# Patient Record
Sex: FEMALE | Race: BLACK OR AFRICAN AMERICAN | NOT HISPANIC OR LATINO | Employment: OTHER | URBAN - METROPOLITAN AREA
[De-identification: names, ages, dates, MRNs, and addresses within clinical notes are randomized per-mention and may not be internally consistent; named-entity substitution may affect disease eponyms.]

---

## 2022-04-16 ENCOUNTER — HISTORICAL (OUTPATIENT)
Dept: ADMINISTRATIVE | Facility: HOSPITAL | Age: 52
End: 2022-04-16

## 2023-01-14 ENCOUNTER — HOSPITAL ENCOUNTER (EMERGENCY)
Facility: HOSPITAL | Age: 53
Discharge: HOME | End: 2023-01-14
Attending: STUDENT IN AN ORGANIZED HEALTH CARE EDUCATION/TRAINING PROGRAM
Payer: OTHER GOVERNMENT

## 2023-01-14 VITALS
HEART RATE: 98 BPM | HEIGHT: 62 IN | SYSTOLIC BLOOD PRESSURE: 156 MMHG | BODY MASS INDEX: 48.76 KG/M2 | OXYGEN SATURATION: 98 % | TEMPERATURE: 98 F | DIASTOLIC BLOOD PRESSURE: 88 MMHG | WEIGHT: 265 LBS | RESPIRATION RATE: 16 BRPM

## 2023-01-14 DIAGNOSIS — R21 RASH: Primary | ICD-10-CM

## 2023-01-14 PROCEDURE — 96372 THER/PROPH/DIAG INJ SC/IM: CPT | Performed by: PHYSICIAN ASSISTANT

## 2023-01-14 PROCEDURE — 82962 GLUCOSE BLOOD TEST: CPT

## 2023-01-14 PROCEDURE — 63600175 PHARM REV CODE 636 W HCPCS: Performed by: PHYSICIAN ASSISTANT

## 2023-01-14 PROCEDURE — 99284 EMERGENCY DEPT VISIT MOD MDM: CPT

## 2023-01-14 PROCEDURE — 25000003 PHARM REV CODE 250: Performed by: PHYSICIAN ASSISTANT

## 2023-01-14 RX ORDER — FAMOTIDINE 20 MG/1
40 TABLET, FILM COATED ORAL 2 TIMES DAILY
Qty: 56 TABLET | Refills: 0 | Status: SHIPPED | OUTPATIENT
Start: 2023-01-14 | End: 2023-04-22

## 2023-01-14 RX ORDER — FAMOTIDINE 20 MG/1
40 TABLET, FILM COATED ORAL 2 TIMES DAILY
Status: DISCONTINUED | OUTPATIENT
Start: 2023-01-14 | End: 2023-01-14

## 2023-01-14 RX ORDER — METHYLPREDNISOLONE SOD SUCC 125 MG
125 VIAL (EA) INJECTION
Status: COMPLETED | OUTPATIENT
Start: 2023-01-14 | End: 2023-01-14

## 2023-01-14 RX ORDER — PREDNISONE 20 MG/1
20 TABLET ORAL DAILY
Qty: 5 TABLET | Refills: 0 | Status: SHIPPED | OUTPATIENT
Start: 2023-01-14 | End: 2023-01-19

## 2023-01-14 RX ORDER — FAMOTIDINE 20 MG/1
40 TABLET, FILM COATED ORAL
Status: COMPLETED | OUTPATIENT
Start: 2023-01-14 | End: 2023-01-14

## 2023-01-14 RX ADMIN — FAMOTIDINE 40 MG: 20 TABLET, FILM COATED ORAL at 09:01

## 2023-01-14 RX ADMIN — METHYLPREDNISOLONE SODIUM SUCCINATE 125 MG: 125 INJECTION, POWDER, FOR SOLUTION INTRAMUSCULAR; INTRAVENOUS at 09:01

## 2023-01-15 LAB — POCT GLUCOSE: 139 MG/DL (ref 70–110)

## 2023-01-15 NOTE — ED PROVIDER NOTES
Encounter Date: 1/14/2023       History     Chief Complaint   Patient presents with    Rash     Complaint of rash to body, arms, legs.  Did change wash detergent recently.       See MDM    The history is provided by the patient. No  was used.   Review of patient's allergies indicates:  No Known Allergies  No past medical history on file.  No past surgical history on file.  No family history on file.     Review of Systems   Constitutional:  Negative for fever.   HENT:  Negative for sore throat.    Respiratory:  Negative for shortness of breath.    Cardiovascular:  Negative for chest pain.   Gastrointestinal:  Negative for nausea.   Genitourinary:  Negative for dysuria.   Musculoskeletal:  Negative for back pain.   Skin:  Positive for rash.   Neurological:  Negative for weakness.   Hematological:  Does not bruise/bleed easily.   All other systems reviewed and are negative.    Physical Exam     Initial Vitals [01/14/23 2021]   BP Pulse Resp Temp SpO2   (!) 155/94 (!) 113 20 98.3 °F (36.8 °C) 97 %      MAP       --         Physical Exam    Nursing note and vitals reviewed.  Constitutional: She appears well-developed and well-nourished.   HENT:   Head: Normocephalic and atraumatic.   Mouth/Throat: Uvula is midline, oropharynx is clear and moist and mucous membranes are normal.   Eyes: EOM are normal. Pupils are equal, round, and reactive to light.   Neck: Neck supple.   Cardiovascular:  Normal rate and regular rhythm.           Pulmonary/Chest: Breath sounds normal. She has no decreased breath sounds. She has no wheezes. She has no rhonchi.   Musculoskeletal:         General: Normal range of motion.      Cervical back: Neck supple.     Neurological: She is alert and oriented to person, place, and time. She has normal strength. GCS score is 15. GCS eye subscore is 4. GCS verbal subscore is 5. GCS motor subscore is 6.   Skin: Skin is warm and dry. Rash noted. Rash is maculopapular (to diffuse chest,  "BUE, bilateral thighs, abdominal wall). Rash is not macular and not papular.   Psychiatric: She has a normal mood and affect.       ED Course   Procedures  Labs Reviewed - No data to display       Imaging Results    None          Medications   methylPREDNISolone sodium succinate injection 125 mg (has no administration in time range)   famotidine tablet 40 mg (has no administration in time range)     Medical Decision Making:   Initial Assessment:   52 y.o. female presents to the ED with diffuse rash to chest wall, bilateral upper extremities, bilateral thighs, abdominal wall for the last few weeks.  States it is progressing and not getting any better.  Patient states she went to urgent care on January 4th, where she got a steroid shot and medication which he has been taking with very minimal relief.  States something similar to this happened in December after getting the shingles vaccine.  States she received a steroid shot and notes that her symptoms were gone in 3 days.  Denies throat pain or swelling, chest pain, cough, wheezing, shortness of breath  Differential Diagnosis:   Rash, contact dermatitis, allergic reaction, viral exanthem  ED Management:  Patient was given Pepcid in the ED as well as Solu-Medrol.  Will discharge patient home with oral steroids as well as Pepcid to take over the next few days.  Long discussion with patient regarding immunology is a next step.  Patient is a  and has [facility].  I will give her multiple names with immunologist in [facility] for her to get set up with an outpatient basis in order for her to have coverage.  Patient's vital signs stable.  Discussed strict return precautions for any worsening symptoms..                 BP (!) 156/88   Pulse 98   Temp 98.3 °F (36.8 °C) (Oral)   Resp 16   Ht 5' 2.21" (1.58 m)   Wt 120.2 kg (265 lb)   SpO2 98%   BMI 48.15 kg/m²          Clinical Impression:   Final diagnoses:  [R21] Rash (Primary)        ED " Disposition Condition    Discharge Stable          ED Prescriptions       Medication Sig Dispense Start Date End Date Auth. Provider    famotidine (PEPCID) 20 MG tablet Take 2 tablets (40 mg total) by mouth 2 (two) times daily. for 14 days 56 tablet 1/14/2023 1/28/2023 Jace Montilla PA-C    predniSONE (DELTASONE) 20 MG tablet Take 1 tablet (20 mg total) by mouth once daily. for 5 days 5 tablet 1/14/2023 1/19/2023 Jace Montilla PA-C          Follow-up Information       Follow up With Specialties Details Why Contact Info    Primary care provider  In 2 days As needed     [facility] - Emergency Dept Emergency Medicine In 1 week If symptoms worsen [address] [id number] [phone]    Eveline Conklin MD Allergy and Immunology  Immunology [address] [place]  [address]  [facility]  [facility] [address]  [phone]      Rafiq Garcia MD Allergy and Immunology  Immunology [address] [facility] [address]  [phone]      Luke Whaley MD Allergy   [address]  [facility] [address]  [phone]               Jace Montilla PA-C  01/14/23 2125

## 2023-01-15 NOTE — DISCHARGE INSTRUCTIONS
Make sure to avoid foods that you believe could be the source of your reaction.  Do not take hot baths and avoid overheating.  Take oral steroids beginning tomorrow around lunchtime.  Also take Pepcid daily as prescribed.  Return to the ER immediately if symptoms progress such as chest pain, shortness breath, wheezing, throat swelling

## 2023-04-22 ENCOUNTER — HOSPITAL ENCOUNTER (EMERGENCY)
Facility: HOSPITAL | Age: 53
Discharge: HOME | End: 2023-04-22
Attending: EMERGENCY MEDICINE
Payer: OTHER GOVERNMENT

## 2023-04-22 VITALS
SYSTOLIC BLOOD PRESSURE: 138 MMHG | HEIGHT: 62 IN | OXYGEN SATURATION: 98 % | WEIGHT: 262 LBS | BODY MASS INDEX: 48.21 KG/M2 | HEART RATE: 114 BPM | DIASTOLIC BLOOD PRESSURE: 84 MMHG | RESPIRATION RATE: 18 BRPM

## 2023-04-22 DIAGNOSIS — K52.9 GASTROENTERITIS: ICD-10-CM

## 2023-04-22 DIAGNOSIS — R10.13 EPIGASTRIC ABDOMINAL PAIN: Primary | ICD-10-CM

## 2023-04-22 LAB
ALBUMIN SERPL-MCNC: 3.9 G/DL (ref 3.5–5)
ALBUMIN/GLOB SERPL: 1.1 RATIO (ref 1.1–2)
ALP SERPL-CCNC: 104 UNIT/L (ref 40–150)
ALT SERPL-CCNC: 51 UNIT/L (ref 0–55)
APPEARANCE UR: CLEAR
AST SERPL-CCNC: 48 UNIT/L (ref 5–34)
BACTERIA #/AREA URNS AUTO: NORMAL /HPF
BASOPHILS # BLD AUTO: 0.01 X10(3)/MCL (ref 0–0.2)
BASOPHILS NFR BLD AUTO: 0.1 %
BILIRUB UR QL STRIP.AUTO: NEGATIVE MG/DL
BILIRUBIN DIRECT+TOT PNL SERPL-MCNC: 0.4 MG/DL
BUN SERPL-MCNC: 11.5 MG/DL (ref 9.8–20.1)
CALCIUM SERPL-MCNC: 9.5 MG/DL (ref 8.4–10.2)
CHLORIDE SERPL-SCNC: 106 MMOL/L (ref 98–107)
CO2 SERPL-SCNC: 22 MMOL/L (ref 22–29)
COLOR UR AUTO: YELLOW
CREAT SERPL-MCNC: 0.77 MG/DL (ref 0.55–1.02)
EOSINOPHIL # BLD AUTO: 0 X10(3)/MCL (ref 0–0.9)
EOSINOPHIL NFR BLD AUTO: 0 %
ERYTHROCYTE [DISTWIDTH] IN BLOOD BY AUTOMATED COUNT: 13 % (ref 11.5–17)
GFR SERPLBLD CREATININE-BSD FMLA CKD-EPI: >60 MLS/MIN/1.73/M2
GLOBULIN SER-MCNC: 3.7 GM/DL (ref 2.4–3.5)
GLUCOSE SERPL-MCNC: 109 MG/DL (ref 74–100)
GLUCOSE UR QL STRIP.AUTO: NEGATIVE MG/DL
HCT VFR BLD AUTO: 42.5 % (ref 37–47)
HGB BLD-MCNC: 14.5 G/DL (ref 12–16)
IMM GRANULOCYTES # BLD AUTO: 0.02 X10(3)/MCL (ref 0–0.04)
IMM GRANULOCYTES NFR BLD AUTO: 0.2 %
KETONES UR QL STRIP.AUTO: ABNORMAL MG/DL
LEUKOCYTE ESTERASE UR QL STRIP.AUTO: ABNORMAL UNIT/L
LIPASE SERPL-CCNC: 4 U/L
LYMPHOCYTES # BLD AUTO: 0.33 X10(3)/MCL (ref 0.6–4.6)
LYMPHOCYTES NFR BLD AUTO: 4 %
MCH RBC QN AUTO: 28.5 PG (ref 27–31)
MCHC RBC AUTO-ENTMCNC: 34.1 G/DL (ref 33–36)
MCV RBC AUTO: 83.7 FL (ref 80–94)
MONOCYTES # BLD AUTO: 0.27 X10(3)/MCL (ref 0.1–1.3)
MONOCYTES NFR BLD AUTO: 3.3 %
NEUTROPHILS # BLD AUTO: 7.56 X10(3)/MCL (ref 2.1–9.2)
NEUTROPHILS NFR BLD AUTO: 92.4 %
NITRITE UR QL STRIP.AUTO: NEGATIVE
NRBC BLD AUTO-RTO: 0 %
PH UR STRIP.AUTO: 7.5 [PH]
PLATELET # BLD AUTO: 319 X10(3)/MCL (ref 130–400)
PMV BLD AUTO: 9.7 FL (ref 7.4–10.4)
POTASSIUM SERPL-SCNC: 3.2 MMOL/L (ref 3.5–5.1)
PROT SERPL-MCNC: 7.6 GM/DL (ref 6.4–8.3)
PROT UR QL STRIP.AUTO: NEGATIVE MG/DL
RBC # BLD AUTO: 5.08 X10(6)/MCL (ref 4.2–5.4)
RBC #/AREA URNS AUTO: <5 /HPF
RBC UR QL AUTO: NEGATIVE UNIT/L
SODIUM SERPL-SCNC: 139 MMOL/L (ref 136–145)
SP GR UR STRIP.AUTO: 1.02 (ref 1–1.03)
SQUAMOUS #/AREA URNS AUTO: <5 /HPF
TROPONIN I SERPL-MCNC: <0.01 NG/ML (ref 0–0.04)
UROBILINOGEN UR STRIP-ACNC: 1 MG/DL
WBC # SPEC AUTO: 8.2 X10(3)/MCL (ref 4.5–11.5)
WBC #/AREA URNS AUTO: <5 /HPF

## 2023-04-22 PROCEDURE — C9113 INJ PANTOPRAZOLE SODIUM, VIA: HCPCS | Performed by: PHYSICIAN ASSISTANT

## 2023-04-22 PROCEDURE — 63600175 PHARM REV CODE 636 W HCPCS: Performed by: PHYSICIAN ASSISTANT

## 2023-04-22 PROCEDURE — 85025 COMPLETE CBC W/AUTO DIFF WBC: CPT | Performed by: PHYSICIAN ASSISTANT

## 2023-04-22 PROCEDURE — 80053 COMPREHEN METABOLIC PANEL: CPT | Performed by: PHYSICIAN ASSISTANT

## 2023-04-22 PROCEDURE — 84484 ASSAY OF TROPONIN QUANT: CPT | Performed by: PHYSICIAN ASSISTANT

## 2023-04-22 PROCEDURE — 93005 ELECTROCARDIOGRAM TRACING: CPT

## 2023-04-22 PROCEDURE — 83690 ASSAY OF LIPASE: CPT | Performed by: PHYSICIAN ASSISTANT

## 2023-04-22 PROCEDURE — 96374 THER/PROPH/DIAG INJ IV PUSH: CPT

## 2023-04-22 PROCEDURE — 96375 TX/PRO/DX INJ NEW DRUG ADDON: CPT

## 2023-04-22 PROCEDURE — 81001 URINALYSIS AUTO W/SCOPE: CPT | Performed by: PHYSICIAN ASSISTANT

## 2023-04-22 PROCEDURE — 93010 ELECTROCARDIOGRAM REPORT: CPT | Mod: ,,, | Performed by: INTERNAL MEDICINE

## 2023-04-22 PROCEDURE — 99285 EMERGENCY DEPT VISIT HI MDM: CPT | Mod: 25

## 2023-04-22 PROCEDURE — 93010 EKG 12-LEAD: ICD-10-PCS | Mod: ,,, | Performed by: INTERNAL MEDICINE

## 2023-04-22 RX ORDER — PANTOPRAZOLE SODIUM 20 MG/1
20 TABLET, DELAYED RELEASE ORAL DAILY
Qty: 10 TABLET | Refills: 0 | Status: SHIPPED | OUTPATIENT
Start: 2023-04-22 | End: 2023-05-02

## 2023-04-22 RX ORDER — ONDANSETRON 4 MG/1
4 TABLET, FILM COATED ORAL EVERY 6 HOURS
Qty: 12 TABLET | Refills: 0 | Status: SHIPPED | OUTPATIENT
Start: 2023-04-22

## 2023-04-22 RX ORDER — PANTOPRAZOLE SODIUM 40 MG/10ML
40 INJECTION, POWDER, LYOPHILIZED, FOR SOLUTION INTRAVENOUS
Status: COMPLETED | OUTPATIENT
Start: 2023-04-22 | End: 2023-04-22

## 2023-04-22 RX ORDER — PANTOPRAZOLE SODIUM 20 MG/1
20 TABLET, DELAYED RELEASE ORAL DAILY
Qty: 10 TABLET | Refills: 0 | Status: SHIPPED | OUTPATIENT
Start: 2023-04-22 | End: 2023-04-22 | Stop reason: SDUPTHER

## 2023-04-22 RX ORDER — DICYCLOMINE HYDROCHLORIDE 20 MG/1
20 TABLET ORAL 2 TIMES DAILY
Qty: 20 TABLET | Refills: 0 | Status: SHIPPED | OUTPATIENT
Start: 2023-04-22 | End: 2023-04-22 | Stop reason: SDUPTHER

## 2023-04-22 RX ORDER — ONDANSETRON 2 MG/ML
4 INJECTION INTRAMUSCULAR; INTRAVENOUS
Status: COMPLETED | OUTPATIENT
Start: 2023-04-22 | End: 2023-04-22

## 2023-04-22 RX ORDER — DICYCLOMINE HYDROCHLORIDE 20 MG/1
20 TABLET ORAL 2 TIMES DAILY
Qty: 20 TABLET | Refills: 0 | Status: SHIPPED | OUTPATIENT
Start: 2023-04-22 | End: 2023-05-02

## 2023-04-22 RX ADMIN — PANTOPRAZOLE SODIUM 40 MG: 40 INJECTION, POWDER, LYOPHILIZED, FOR SOLUTION INTRAVENOUS at 10:04

## 2023-04-22 RX ADMIN — SODIUM CHLORIDE, POTASSIUM CHLORIDE, SODIUM LACTATE AND CALCIUM CHLORIDE 1000 ML: 600; 310; 30; 20 INJECTION, SOLUTION INTRAVENOUS at 10:04

## 2023-04-22 RX ADMIN — ONDANSETRON 4 MG: 2 INJECTION INTRAMUSCULAR; INTRAVENOUS at 10:04

## 2023-04-22 NOTE — FIRST PROVIDER EVALUATION
"Medical screening examination initiated.  I have conducted a focused provider triage encounter, findings are as follows:    Chief Complaint   Patient presents with    Gastroesophageal Reflux     Reports feeling indigestion last night, this morning woke up feeling weak/n/v/d. Reports starting ozempic medication, first shot on Wednesday.      Brief history of present illness:  52 y.o. female presents to the ED with epigastric abdominal pain with n/v/d and indigestion onset last night. Notes generalized weakness. States she started with the [facility] this week for weight loss and first Ozempic injection. Denies fever, chills.     Vitals:    04/22/23 1554   BP: 138/84   Pulse: (!) 114   Resp: 18   SpO2: 98%   Weight: 118.8 kg (262 lb)   Height: 5' 2" (1.575 m)     Pertinent physical exam:  Awake, alert, ambulatory, non-labored respirations    Brief workup plan:  labs, UA, EKG    Preliminary workup initiated; this workup will be continued and followed by the physician or advanced practice provider that is assigned to the patient when roomed.  "

## 2023-04-23 NOTE — ED PROVIDER NOTES
Encounter Date: 4/22/2023       History     Chief Complaint   Patient presents with    Gastroesophageal Reflux     Reports feeling indigestion last night, this morning woke up feeling weak/n/v/d. Reports starting ozempic medication, first shot on Wednesday.      This is a pleasant 52-year-old female who comes in with epigastric abdominal pain since last night.  Patient feels weak and she did feel nauseous patient reports that she started Ozempic medication for weight lost her last shot was 2 days ago.  She denies chest pain shortness of breath.       Abdominal Cramping  The primary symptoms of the illness include abdominal pain. The primary symptoms of the illness do not include fever, shortness of breath, nausea or dysuria. The current episode started just prior to arrival.   The abdominal pain is located in the epigastric region. The abdominal pain does not radiate. The abdominal pain is relieved by nothing. The abdominal pain is exacerbated by vomiting.   The patient states that she believes she is currently not pregnant. The patient has not had a change in bowel habit. Symptoms associated with the illness do not include chills or back pain.   Review of patient's allergies indicates:  No Known Allergies  No past medical history on file.  No past surgical history on file.  No family history on file.     Review of Systems   Constitutional:  Negative for chills and fever.   HENT:  Negative for sore throat.    Respiratory:  Negative for shortness of breath.    Cardiovascular:  Negative for chest pain.   Gastrointestinal:  Positive for abdominal pain. Negative for nausea.   Genitourinary:  Negative for dysuria.   Musculoskeletal:  Negative for back pain.   Skin:  Negative for rash.   Neurological:  Negative for weakness.   Hematological:  Does not bruise/bleed easily.     Physical Exam     Initial Vitals [04/22/23 1554]   BP Pulse Resp Temp SpO2   138/84 (!) 114 18 -- 98 %      MAP       --         Physical  Exam    Vitals reviewed.  Constitutional: She appears well-developed.   Cardiovascular:  Normal rate.           Pulmonary/Chest: Breath sounds normal.   Abdominal: There is abdominal tenderness in the epigastric area.   No right CVA tenderness.  No left CVA tenderness. There is tenderness at McBurney's point. There is negative Moraes's sign.     Neurological: She is alert and oriented to person, place, and time. She has normal strength.   Skin: Skin is warm and dry.   Psychiatric: Her behavior is normal. Judgment and thought content normal.       ED Course   Procedures  Labs Reviewed   COMPREHENSIVE METABOLIC PANEL - Abnormal; Notable for the following components:       Result Value    Potassium Level 3.2 (*)     Glucose Level 109 (*)     Globulin 3.7 (*)     Aspartate Aminotransferase 48 (*)     All other components within normal limits   URINALYSIS, REFLEX TO URINE CULTURE - Abnormal; Notable for the following components:    Ketones, UA Trace (*)     Leukocyte Esterase, UA Trace (*)     All other components within normal limits   CBC WITH DIFFERENTIAL - Abnormal; Notable for the following components:    Lymph # 0.33 (*)     All other components within normal limits   LIPASE - Normal   TROPONIN I - Normal   URINALYSIS, MICROSCOPIC - Normal   CBC W/ AUTO DIFFERENTIAL    Narrative:     The following orders were created for panel order CBC auto differential.  Procedure                               Abnormality         Status                     ---------                               -----------         ------                     CBC with Differential[[id number]]        Abnormal            Final result                 Please view results for these tests on the individual orders.     EKG Readings: (Independently Interpreted)   Rhythm: Sinus Tachycardia. Heart Rate: 108. Conduction: Normal. ST Segments: Normal ST Segments. T Waves: Normal. Axis: Normal.     Imaging Results              X-Ray Chest PA And Lateral (Final  result)  Result time 04/22/23 16:34:32      Final result by Patrick Chanel MD (04/22/23 16:34:32)                   Impression:      No acute pulmonary process appreciated.      Electronically signed by: Patrick Chanel  Date:    04/22/2023  Time:    16:34               Narrative:    EXAMINATION:  XR CHEST PA AND LATERAL    CLINICAL HISTORY:  Epigastric pain    TECHNIQUE:  PA and lateral radiographs of the chest    COMPARISON:  Radiography 01/14/2017    FINDINGS:  Normal cardiac silhouette. The lungs are well-inflated. No consolidation identified. No pleural effusion or discernible pneumothorax.                                       Medications   lactated ringers bolus 1,000 mL (1,000 mLs Intravenous New Bag 4/22/23 2202)   pantoprazole injection 40 mg (40 mg Intravenous Given 4/22/23 2204)   ondansetron injection 4 mg (4 mg Intravenous Given 4/22/23 2212)     Medical Decision Making:   Initial Assessment:   This is a pleasant 52-year-old female who comes in with epigastric abdominal pain since last night.  Patient feels weak and she did feel nauseous patient reports that she started Ozempic medication for weight lost her last shot was 2 days ago.  She denies chest pain shortness of breath.   Differential Diagnosis:   Judging by the patient's chief complaint and pertinent history, the patient has the following possible differential diagnoses, including but not limited to the following.  Some of these are deemed to be lower likelihood and some more likely based on my physical exam and history combined with possible lab work and/or imaging studies.   Please see the pertinent studies, and refer to the HPI.  Some of these diagnoses will take further evaluation to fully rule out, perhaps as an outpatient and the patient was encouraged to follow up when discharged for more comprehensive evaluation.    appendicitis, biliary disease, diverticulitis,  AAA, ACS, mesenteric ischemia, intraabdominal abcess, retroperitoneal  abcess, gastritis, gastroenteritis, hepatitis, hernia, pancreatitis, inflammatory bowel disease, PUD, SBP, nephrolithiasis, DKA, sickle cell crisis, consitpation, GERD, IBS   Clinical Tests:   Lab Tests: Reviewed  The following lab test(s) were unremarkable: CBC and CMP       <> Summary of Lab: Potassium 3.2  Previous chest x-ray no acute findings  Radiological Study: Reviewed  ED Management:  IV fluids, Protonix, Zofran.   Provide reinforcement in regards Ozempic, side effects.  Patient voiced understanding she must follow up with primary physician if symptoms worsen  The patient is resting comfortably in no acute distress.  She is hemodynamically stable and is without objective evidence for acute process requiring urgent intervention or hospitalization. I provided counseling to patient with regard to condition, the treatment plan, specific conditions for return, and the importance of follow up. Detailed written and verbal instructions provided to patient and he expressed a verbal understanding of the discharge instructions and overall management plan. Reiterated the importance of medication administration and safety and advised patient to follow up with primary care provider in 3-5 days or sooner if needed.  Answered questions at this time. The patient is stable for discharge.                 ED Course as of 04/22/23 2234   Sat Apr 22, 2023   2214 Improved epigastric pain, tolerating fluids [YG]   2234 Improved abdominal pain  [YG]      ED Course User Index  [YG] JONNIE Pham                 Clinical Impression:   Final diagnoses:  [R10.13] Epigastric abdominal pain (Primary)  [K52.9] Gastroenteritis        ED Disposition Condition    Discharge Stable          ED Prescriptions       Medication Sig Dispense Start Date End Date Auth. Provider    dicyclomine (BENTYL) 20 mg tablet  (Status: Discontinued) Take 1 tablet (20 mg total) by mouth 2 (two) times daily. for 10 days 20 tablet 4/22/2023 4/22/2023 [place]  JONNIE Meier    pantoprazole (PROTONIX) 20 MG tablet  (Status: Discontinued) Take 1 tablet (20 mg total) by mouth once daily. for 10 days 10 tablet 4/22/2023 4/22/2023 JONNIE Pham    dicyclomine (BENTYL) 20 mg tablet Take 1 tablet (20 mg total) by mouth 2 (two) times daily. for 10 days 20 tablet 4/22/2023 5/2/2023 JONNIE Pham    pantoprazole (PROTONIX) 20 MG tablet Take 1 tablet (20 mg total) by mouth once daily. for 10 days 10 tablet 4/22/2023 5/2/2023 JONNIE Pham    ondansetron (ZOFRAN) 4 MG tablet Take 1 tablet (4 mg total) by mouth every 6 (six) hours. 12 tablet 4/22/2023 -- JONNIE Pham          Follow-up Information       Follow up With Specialties Details Why Contact Info    [facility] - Emergency Dept Emergency Medicine  If symptoms worsen [address], [place] [address] [phone]             JONNIE Pham  04/22/23 6767       JONNIE Pham  05/06/23 0102

## 2023-06-15 ENCOUNTER — HOSPITAL ENCOUNTER (EMERGENCY)
Facility: HOSPITAL | Age: 53
Discharge: HOME | End: 2023-06-15
Attending: EMERGENCY MEDICINE
Payer: OTHER GOVERNMENT

## 2023-06-15 VITALS
TEMPERATURE: 98 F | OXYGEN SATURATION: 99 % | RESPIRATION RATE: 20 BRPM | WEIGHT: 262 LBS | SYSTOLIC BLOOD PRESSURE: 150 MMHG | HEART RATE: 79 BPM | HEIGHT: 62 IN | BODY MASS INDEX: 48.21 KG/M2 | DIASTOLIC BLOOD PRESSURE: 88 MMHG

## 2023-06-15 DIAGNOSIS — G62.9 NEUROPATHY: ICD-10-CM

## 2023-06-15 DIAGNOSIS — M79.671 FOOT PAIN, RIGHT: Primary | ICD-10-CM

## 2023-06-15 PROCEDURE — 63600175 PHARM REV CODE 636 W HCPCS

## 2023-06-15 PROCEDURE — 99284 EMERGENCY DEPT VISIT MOD MDM: CPT

## 2023-06-15 PROCEDURE — 96372 THER/PROPH/DIAG INJ SC/IM: CPT

## 2023-06-15 RX ORDER — KETOROLAC TROMETHAMINE 30 MG/ML
30 INJECTION, SOLUTION INTRAMUSCULAR; INTRAVENOUS
Status: COMPLETED | OUTPATIENT
Start: 2023-06-15 | End: 2023-06-15

## 2023-06-15 RX ORDER — GABAPENTIN 100 MG/1
100 CAPSULE ORAL 3 TIMES DAILY
Qty: 21 CAPSULE | Refills: 0 | Status: SHIPPED | OUTPATIENT
Start: 2023-06-15 | End: 2023-06-22

## 2023-06-15 RX ORDER — KETOROLAC TROMETHAMINE 10 MG/1
10 TABLET, FILM COATED ORAL EVERY 6 HOURS PRN
Qty: 20 TABLET | Refills: 0 | Status: SHIPPED | OUTPATIENT
Start: 2023-06-15 | End: 2023-06-20

## 2023-06-15 RX ADMIN — KETOROLAC TROMETHAMINE 30 MG: 30 INJECTION, SOLUTION INTRAMUSCULAR; INTRAVENOUS at 03:06

## 2023-06-15 NOTE — DISCHARGE INSTRUCTIONS
Thanks for letting us take care of you today!  It is our goal to give you courteous care and to keep you comfortable and informed, if you have any questions before you leave I will be happy to try and answer them.    Here is some advice after your visit:      Your visit in the emergency department is NOT definitive care - please follow-up with your primary care doctor and/or specialist within 1 week.  Please return if you have any worsening in your condition or if you have any other concerns.

## 2023-06-15 NOTE — ED PROVIDER NOTES
Encounter Date: 6/15/2023       History     Chief Complaint   Patient presents with    Foot Pain     C/o tingling on bottom of R foot and R toes onset x3 days ago. Denies injury to foot. No swelling/redness noted to area.     52 y.o.  female with a history of hypertension, depression, and osteoarthritis presents to Emergency Department with a chief complaint of atraumatic R foot pain. Symptoms began 3 days ago and have been constant since onset. Associated symptoms include numbness to sole of R foot and R toes. Symptoms are aggravated with palpation and exertion and there are no alleviating factors. The patient denies CP, SOB, recent injury, fever, chills ,or abdominal pain. No other reported symptoms at this time      The history is provided by the patient. No  was used.   Foot Pain  This is a new problem. The current episode started more than 2 days ago. The problem occurs constantly. The problem has not changed since onset.Pertinent negatives include no chest pain, no abdominal pain, no headaches and no shortness of breath. The symptoms are aggravated by exertion. She has tried nothing for the symptoms.   Review of patient's allergies indicates:  No Known Allergies  Past Medical History:   Diagnosis Date    Depression     Essential (primary) hypertension     Unspecified osteoarthritis, unspecified site      Past Surgical History:   Procedure Laterality Date    CHOLECYSTECTOMY      LAPAROSCOPY      TONSILLECTOMY       No family history on file.     Review of Systems   Constitutional:  Negative for fatigue and fever.   Eyes:  Negative for photophobia and visual disturbance.   Respiratory:  Negative for cough, shortness of breath, wheezing and stridor.    Cardiovascular:  Negative for chest pain, palpitations and leg swelling.   Gastrointestinal:  Negative for abdominal pain, nausea and vomiting.   Musculoskeletal:  Positive for arthralgias. Negative for gait problem and joint  swelling.   Neurological:  Positive for numbness. Negative for dizziness, syncope, weakness and headaches.   All other systems reviewed and are negative.    Physical Exam     Initial Vitals [06/15/23 1406]   BP Pulse Resp Temp SpO2   (!) 161/97 78 18 98 °F (36.7 °C) 100 %      MAP       --         Physical Exam    Nursing note and vitals reviewed.  Constitutional: She appears well-developed and well-nourished. She is not diaphoretic.  Non-toxic appearance. No distress.   HENT:   Head: Normocephalic and atraumatic.   Right Ear: External ear normal.   Left Ear: External ear normal.   Nose: Nose normal.   Mouth/Throat: Oropharynx is clear and moist. No oropharyngeal exudate.   Eyes: Conjunctivae and EOM are normal. Pupils are equal, round, and reactive to light.   Neck: Neck supple. No JVD present.   Normal range of motion.  Cardiovascular:  Normal rate, regular rhythm, S1 normal, S2 normal, normal heart sounds, intact distal pulses and normal pulses.           Pulmonary/Chest: Effort normal and breath sounds normal. No stridor. No respiratory distress. She has no wheezes. She exhibits no tenderness.   Abdominal: Abdomen is soft. Bowel sounds are normal. She exhibits no distension. There is no abdominal tenderness. There is no guarding.   Musculoskeletal:         General: Tenderness present. No edema. Normal range of motion.      Cervical back: Normal range of motion and neck supple.      Right foot: Normal range of motion and normal capillary refill. Tenderness present. No deformity. Normal pulse.      Comments: Tenderness noted to sole of R foot. Numbness to sole of R foot and R toes. Full 5/5 ROM noted. CMS intact. All other adjacent joints otherwise normal.        Neurological: She is alert and oriented to person, place, and time. She has normal strength. No sensory deficit. GCS score is 15. GCS eye subscore is 4. GCS verbal subscore is 5. GCS motor subscore is 6.   Skin: Skin is warm and dry. Capillary refill  takes less than 2 seconds. No rash noted. No erythema.   Psychiatric: She has a normal mood and affect. Thought content normal.       ED Course   Procedures  Labs Reviewed - No data to display       Imaging Results    None          Medications   ketorolac injection 30 mg (has no administration in time range)     Medical Decision Making:   Initial Assessment:   Patient arrived to emergency department due to atraumatic R foot pain and numbness/tingling. Symptoms began 3 days ago. Patient reports she is not diabetic and is Semaglutide for weight loss. Denies injury/trauma or extremity weakness. NAD noted.   Differential Diagnosis:   Neuropathy, Foot Pain, Osteoarthritis   ED Management:  Co-morbidities and/or factors adding to the complexity or risk for the patient?: none  Differential diagnoses: Neuropathy, Foot Pain, Osteoarthritis   Decision to obtain previous or outside records?: I did not review records.   Chart Review (nursing home, outside records, CareEverywhere): none  Review of RX medications/new RX prescribed by me?: Prescribed short course of Toradol and Gabapentin. Cautioned on side effects.   Labs/imaging/other tests obtained/considered (risk/benefits of testing discussed): none  Labs/tests intepretation: none  My independent imaging interpretation: none  Treatment/interventions, IV fluids, IV medications: IM Toradol given in ER.   Complex management (IV controlled substances, went to OR, DNR, meds requiring monitoring, transfer, etc)?: none  Workup/treatment affected by social determinants of health?:none   Point of care US done/interpretation: none  Consults/radiologist/EMS/social work/family discussion/alternate history: none  Advanced care planning/end of life discussion: none  Shared decision making: Discussed plan of care and interventions with patient. Agreed to and aware of plan of care. Comfortable being discharged home.   ETOH/smoking/drug cessation discussion: none  Dispo: Patient discharged  home. Patient denies new or additional complaints; no further tests indicated at this time. Verbalized understanding of instructions. No emergent or apparent distress noted prior to discharge. To follow up with PCP in 1 week as needed. Strict ER return precautions given.                             Clinical Impression:   Final diagnoses:  [M79.671] Foot pain, right (Primary)  [G62.9] Neuropathy        ED Disposition Condition    Discharge Stable          ED Prescriptions       Medication Sig Dispense Start Date End Date Auth. Provider    ketorolac (TORADOL) 10 mg tablet Take 1 tablet (10 mg total) by mouth every 6 (six) hours as needed for Pain. 20 tablet 6/15/2023 6/20/2023 Celine Villaseñor NP    gabapentin (NEURONTIN) 100 MG capsule Take 1 capsule (100 mg total) by mouth 3 (three) times daily. for 7 days 21 capsule 6/15/2023 6/22/2023 Celine Villaseñor NP          Follow-up Information       Follow up With Specialties Details Why Contact Info    PCP  Call in 1 week As needed, If symptoms worsen     [facility] - Emergency Dept Emergency Medicine Go to  If symptoms worsen, As needed [facility] [facility] [address]  [phone]             Celine Villaseñor NP  06/15/23 1277

## 2023-06-19 ENCOUNTER — HOSPITAL ENCOUNTER (EMERGENCY)
Facility: HOSPITAL | Age: 53
Discharge: HOME | End: 2023-06-19
Attending: STUDENT IN AN ORGANIZED HEALTH CARE EDUCATION/TRAINING PROGRAM
Payer: OTHER GOVERNMENT

## 2023-06-19 VITALS
BODY MASS INDEX: 48.21 KG/M2 | WEIGHT: 262 LBS | DIASTOLIC BLOOD PRESSURE: 83 MMHG | TEMPERATURE: 98 F | HEART RATE: 108 BPM | SYSTOLIC BLOOD PRESSURE: 142 MMHG | HEIGHT: 62 IN | OXYGEN SATURATION: 96 % | RESPIRATION RATE: 18 BRPM

## 2023-06-19 DIAGNOSIS — R20.2 PARESTHESIA OF RIGHT FOOT: Primary | ICD-10-CM

## 2023-06-19 PROCEDURE — 99281 EMR DPT VST MAYX REQ PHY/QHP: CPT

## 2023-06-19 NOTE — FIRST PROVIDER EVALUATION
"Medical screening examination initiated.  I have conducted a focused provider triage encounter, findings are as follows:    Brief history of present illness:  51 y/o female who presents with numbness to bottom of foot, some improvement since seen last but hasn't improved completely so wanted to get checked again.     Vitals:    06/19/23 1609   BP: (!) 142/83   Pulse: 108   Resp: 18   Temp: 98.4 °F (36.9 °C)   SpO2: 96%   Weight: 118.8 kg (262 lb)   Height: 5' 2" (1.575 m)       Pertinent physical exam:  alert, nonlabored, ambulatory     Brief workup plan:  exam    Preliminary workup initiated; this workup will be continued and followed by the physician or advanced practice provider that is assigned to the patient when roomed.  "

## 2023-06-19 NOTE — DISCHARGE INSTRUCTIONS
Continue taking prescribed medications as directed. Follow up within 3-4 weeks as needed with primary care.

## 2023-06-24 NOTE — ED PROVIDER NOTES
Encounter Date: 6/19/2023       History     Chief Complaint   Patient presents with    Numbness     Numbness to bottom of right foot in toe area, seen here last week for numbness, reports improvement of numbness with medication that was prescribed in ED. Recently started Aspen weight loss injection shots.      See MDM    The history is provided by the patient. No  was used.   Review of patient's allergies indicates:  No Known Allergies  Past Medical History:   Diagnosis Date    Depression     Essential (primary) hypertension     Unspecified osteoarthritis, unspecified site      Past Surgical History:   Procedure Laterality Date    CHOLECYSTECTOMY      LAPAROSCOPY      TONSILLECTOMY       No family history on file.     Review of Systems   Neurological:  Positive for numbness (right foot (great toe)).   All other systems reviewed and are negative.    Physical Exam     Initial Vitals [06/19/23 1609]   BP Pulse Resp Temp SpO2   (!) 142/83 108 18 98.4 °F (36.9 °C) 96 %      MAP       --         Physical Exam    Nursing note and vitals reviewed.  Constitutional: She appears well-developed and well-nourished.   Cardiovascular:  Regular rhythm and intact distal pulses.           Musculoskeletal:      Right foot: Normal range of motion. No bony tenderness.      Comments: Right great toe mildly decrease in sensation. Has good ROM. Ambulatory steadily. Can plantar flex and dorsiflex      Neurological: She is alert and oriented to person, place, and time.   Skin: Skin is warm and dry.   Psychiatric: She has a normal mood and affect.       ED Course   Procedures  Labs Reviewed - No data to display       Imaging Results    None          Medications - No data to display  Medical Decision Making:   History:   Old Medical Records: I decided to obtain old medical records.  Old Records Summarized: records from previous admission(s).       <> Summary of Records: Seen on 6/15/23 for same, dx with paresthesia and  given gabapentin and nsaids.   Differential Diagnosis:   Includes but not limited to paresthesia, foot drop  ED Management:  53 y/o female who presents with continued yet improving right foot/great toe numbness. She states she was seen here couple days ago and was evaluated and given gabapentin and nsaids. States feels it is improving but it is still there so came back to get rechecked.     She has great strength and is ambulatory. Good pulses. Discussed that she needs to continue medications and that this can be a slow process.                         Clinical Impression:   Final diagnoses:  [R20.2] Paresthesia of right foot (Primary)        ED Disposition Condition    Discharge Stable          ED Prescriptions    None       Follow-up Information       Follow up With Specialties Details Why Contact Info    primary care provider  Call in 3 weeks               JAN Polk  06/23/23 [id number]